# Patient Record
Sex: FEMALE | Race: WHITE | NOT HISPANIC OR LATINO | URBAN - METROPOLITAN AREA
[De-identification: names, ages, dates, MRNs, and addresses within clinical notes are randomized per-mention and may not be internally consistent; named-entity substitution may affect disease eponyms.]

---

## 2021-03-26 ENCOUNTER — OFFICE (OUTPATIENT)
Dept: URBAN - METROPOLITAN AREA CLINIC 102 | Facility: CLINIC | Age: 51
End: 2021-03-26

## 2021-03-26 VITALS
WEIGHT: 142 LBS | TEMPERATURE: 97.7 F | HEIGHT: 66 IN | DIASTOLIC BLOOD PRESSURE: 87 MMHG | SYSTOLIC BLOOD PRESSURE: 126 MMHG | HEART RATE: 93 BPM

## 2021-03-26 DIAGNOSIS — R11.0 NAUSEA: ICD-10-CM

## 2021-03-26 DIAGNOSIS — M32.10 SYSTEMIC LUPUS ERYTHEMATOSUS, ORGAN OR SYSTEM INVOLVEMENT UN: ICD-10-CM

## 2021-03-26 DIAGNOSIS — R93.3 ABNORMAL FINDINGS ON DIAGNOSTIC IMAGING OF OTHER PARTS OF DI: ICD-10-CM

## 2021-03-26 DIAGNOSIS — M32.12 PERICARDITIS IN SYSTEMIC LUPUS ERYTHEMATOSUS: ICD-10-CM

## 2021-03-26 DIAGNOSIS — R13.19 OTHER DYSPHAGIA: ICD-10-CM

## 2021-03-26 PROCEDURE — 99204 OFFICE O/P NEW MOD 45 MIN: CPT | Performed by: PHYSICIAN ASSISTANT

## 2021-03-26 NOTE — SERVICEHPINOTES
MARIALUISA TOURE   is a   50  female who complains of dysphagia. Symptoms started a few months ago. She has a globus sensation and can feel hoarse. It can occur to both solids and liquids. She also has pain with swallowing. Having more bad days than good and it is getting more frequent. + nausea when she lays down. She is taking Prevacid OTC two a day in the am. This hasn't helped thus far. She is avoiding spicy foods as certain foods burn her mouth. No early satiety, unexplained weight loss, etc. BS shows stricture noted at the cervical esophagus at the C4 level no mass, or other abnormalities. She has lupus and pericarditis. Has been taking NSAIDs for this. Following with [facility], Dr. Pichardo. No known seasonal allergies or food allergies.

## 2021-04-30 ENCOUNTER — ON CAMPUS - OUTPATIENT (OUTPATIENT)
Dept: URBAN - METROPOLITAN AREA HOSPITAL 37 | Facility: HOSPITAL | Age: 51
End: 2021-04-30
Payer: COMMERCIAL

## 2021-04-30 DIAGNOSIS — K29.60 OTHER GASTRITIS WITHOUT BLEEDING: ICD-10-CM

## 2021-04-30 DIAGNOSIS — R13.10 DYSPHAGIA, UNSPECIFIED: ICD-10-CM

## 2021-04-30 DIAGNOSIS — K22.9 DISEASE OF ESOPHAGUS, UNSPECIFIED: ICD-10-CM

## 2021-04-30 PROCEDURE — 43239 EGD BIOPSY SINGLE/MULTIPLE: CPT | Performed by: INTERNAL MEDICINE
